# Patient Record
(demographics unavailable — no encounter records)

---

## 2025-01-28 NOTE — PHYSICAL EXAM

## 2025-01-28 NOTE — HISTORY OF PRESENT ILLNESS
[Mother] : mother [Grade: ____] : Grade: [unfilled] [Yes] : Patient goes to dentist yearly [Toothpaste] : Primary Fluoride Source: Toothpaste [Uses electronic nicotine delivery system] : does not use electronic nicotine delivery system [Uses tobacco] : does not use tobacco [Uses drugs] : does not use drugs  [Drinks alcohol] : does not drink alcohol [Uses safety belts/safety equipment] : uses safety belts/safety equipment  [Impaired/distracted driving] : no impaired/distracted driving [Has peer relationships free of violence] : has peer relationships free of violence [No] : Patient has not had sexual intercourse [Displays self-confidence] : displays self-confidence [Has ways to cope with stress] : has ways to cope with stress [Has problems with sleep] : does not have problems with sleep [Gets depressed, anxious, or irritable/has mood swings] : gets depressed, anxious, or irritable/has mood swings [Has thought about hurting self or considered suicide] : has not thought about hurting self or considered suicide [de-identified] : Lives with parents, brothers & sister -  [de-identified] : [employer] - doing well - [employer]. Interested in joining video game club [de-identified] : Good appetite, drinking mostly water [de-identified] : Working at [employer], play video games, hang out with friends.  [de-identified] : + anxiety about height

## 2025-01-28 NOTE — HISTORY OF PRESENT ILLNESS
[Mother] : mother [Grade: ____] : Grade: [unfilled] [Yes] : Patient goes to dentist yearly [Toothpaste] : Primary Fluoride Source: Toothpaste [Uses electronic nicotine delivery system] : does not use electronic nicotine delivery system [Uses tobacco] : does not use tobacco [Drinks alcohol] : does not drink alcohol [Uses drugs] : does not use drugs  [Uses safety belts/safety equipment] : uses safety belts/safety equipment  [Impaired/distracted driving] : no impaired/distracted driving [Has peer relationships free of violence] : has peer relationships free of violence [No] : Patient has not had sexual intercourse [Has ways to cope with stress] : has ways to cope with stress [Displays self-confidence] : displays self-confidence [Has problems with sleep] : does not have problems with sleep [Gets depressed, anxious, or irritable/has mood swings] : gets depressed, anxious, or irritable/has mood swings [Has thought about hurting self or considered suicide] : has not thought about hurting self or considered suicide [de-identified] : Lives with parents, brothers & sister -  [de-identified] : [employer] - doing well - [employer]. Interested in joining video game club [de-identified] : Good appetite, drinking mostly water [de-identified] : Working at [employer], play video games, hang out with friends.  [de-identified] : + anxiety about height

## 2025-01-28 NOTE — DISCUSSION/SUMMARY
[FreeTextEntry1] :  15 yo boy here for WCC.   Scoliosis - Orthopedic follow up  WCC - BMI  95 %tile - Continue balanced diet with all food groups. - PHQ9, Crafft & PSC-y reviewed - no concerns - Brush teeth twice a day with toothpaste. Recommend visit to dentist at least yearly.  - Maintain consistent daily routines and sleep schedule.   -Personal hygiene, puberty, and sexual health reviewed.  - Encourage physical activity. - School discussed. - Labs reviewed over the past 2 year, no concerns - to consider repeat next year or sooner if concerns arise - Return 1 year for routine well child check.

## 2025-01-28 NOTE — PHYSICAL EXAM
